# Patient Record
Sex: FEMALE | Race: WHITE
[De-identification: names, ages, dates, MRNs, and addresses within clinical notes are randomized per-mention and may not be internally consistent; named-entity substitution may affect disease eponyms.]

---

## 2020-05-23 ENCOUNTER — HOSPITAL ENCOUNTER (EMERGENCY)
Dept: HOSPITAL 11 - JP.ED | Age: 50
Discharge: HOME | End: 2020-05-23
Payer: COMMERCIAL

## 2020-05-23 DIAGNOSIS — Z88.1: ICD-10-CM
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
## 2020-05-23 DIAGNOSIS — J45.909: ICD-10-CM

## 2020-05-23 DIAGNOSIS — Z23: ICD-10-CM

## 2020-05-23 DIAGNOSIS — S60.552A: Primary | ICD-10-CM

## 2020-05-23 DIAGNOSIS — Z88.2: ICD-10-CM

## 2020-05-23 NOTE — EDM.PDOC
ED HPI GENERAL MEDICAL PROBLEM





- General


Chief Complaint: Skin Complaint


Stated Complaint: HOOK IN LEFT HAND


Time Seen by Provider: 20 21:22


Source of Information: Reports: Patient


History Limitations: Reports: No Limitations





- History of Present Illness


INITIAL COMMENTS - FREE TEXT/NARRATIVE: 





Patient presents for evaluation of a fishhook embedded in left hand. She had 

been fishing earlier today and near the conclusion of the day happened to 

inadvertently lodge one of the hooks in the dorsum of left hand. There were is 

debate over exactly what to do with this at home versus coming in for removal. 

Based on how it felt, she decided to come in here. There is been minimal 

bleeding from the puncture area. She does not recall previous recent tetanus 

vaccination.


Onset: Today, Sudden


Location: Reports: Upper Extremity, Left


Quality: Reports: Sharp


Improves with: Reports: None


Worsens with: Reports: Movement


Associated Symptoms: Reports: No Other Symptoms


  ** Left Hand


Pain Score (Numeric/FACES): 5





- Related Data


 Allergies











Allergy/AdvReac Type Severity Reaction Status Date / Time


 


sulfamethoxazole Allergy  Rash Verified 20 21:18





[From Bactrim]     


 


trimethoprim [From Bactrim] Allergy  Rash Verified 20 21:18











Home Meds: 


 Home Meds





NK [No Known Home Meds]  20 [History]











Past Medical History


HEENT History: Reports: None


Cardiovascular History: Reports: None


Respiratory History: Reports: Asthma


Gastrointestinal History: Reports: None


OB/GYN History: Reports: Pregnancy


Musculoskeletal History: Reports: None


Neurological History: Reports: None


Psychiatric History: Reports: None


Endocrine/Metabolic History: Reports: None


Hematologic History: Reports: None


Immunologic History: Reports: None


Oncologic (Cancer) History: Reports: None


Dermatologic History: Reports: None





- Infectious Disease History


Infectious Disease History: Reports: None





- Past Surgical History


Head Surgeries/Procedures: Reports: None


Female  Surgical History: Reports:  Section





ED ROS GENERAL





- Review of Systems


Review Of Systems: Comprehensive ROS is negative, except as noted in HPI.





ED EXAM, SKIN/RASH


Exam: See Below


Text/Narrative:: 





This is an adult female seated on the exam table in room 1.


Exam Limited By: No Limitations


General Appearance: Alert, No Apparent Distress


Extremities: Other (1 of 3 parts of a lure segment is embedded in the hand 

along the path of metacarpal 4. There is no active bleeding at this time.)





Course





- Vital Signs


Last Recorded V/S: 


 Last Vital Signs











Temp  35.6 C L  20 21:21


 


Pulse  58 L  20 21:21


 


Resp  16   20 21:21


 


BP  131/72   20 21:21


 


Pulse Ox  100   20 21:21














- Orders/Labs/Meds


Orders: 


 Active Orders 24 hr











 Category Date Time Status


 


 Vaccines to be Administered [RC] PER UNIT ROUTINE Care  20 22:26 Active











Meds: 


Medications














Discontinued Medications














Generic Name Dose Route Start Last Admin





  Trade Name Florina  PRN Reason Stop Dose Admin


 


Bacitracin  1 dose  20 22:20  20 22:31





  Bacitracin Oint 1 Gm  TOP  20 22:21  1 dose





  ONETIME ONE   Administration





     





     





     





     


 


Diphtheria/Tetanus/Acell Pertussis  0.5 ml  20 22:26  20 22:34





  Adacel  IM  20 22:27  0.5 ml





  .ONCE ONE   Administration





     





     





     





     


 


Diphtheria/Tetanus/Acell Pertussis  Confirm  20 22:28  





  Adacel  Administered  20 22:29  





  Dose   





  0.5 ml   





  .ROUTE   





  .STK-MED ONE   





     





     





     





     


 


Lidocaine/Epinephrine  2 ml  20 21:45  20 22:16





  Xylocaine 1% With Epinephrine 1:100,000  INFILT   2 ml





  ASDIRECTED [facility]   Administration





     





     





     





     














- Re-Assessments/Exams


Free Text/Narrative Re-Assessment/Exam: 





20 03:10


I reviewed options for hook removal. 1% lidocaine with epinephrine was used to 

infiltrate the puncture zone. Following this, the hook karen was rotated until 

it poked out through the skin. An orthopedic pin cutter was used to snip the 

karen from the lure and the remainder of the hook was backed out through its 

point of entry. There was no additional bleeding area did bacitracin and a Band-

Aid were applied to the puncture site. I recommend that she use soap and water 

washing to the area at least once a day followed by application of a small 

amount of bacitracin or similar first-aid ointment twice daily with Band-Aid 

covering. Reviewed signs of infection to watch for. She was given an Adacel 

booster.





Departure





- Departure


Time of Disposition: 22:25


Disposition: Home, Self-Care 01


Condition: Good


Clinical Impression: 


Fish hook injury of hand


Qualifiers:


 Encounter type: initial encounter Laterality: left Qualified Code(s): S69.92XA 

- Unspecified injury of left wrist, hand and finger(s), initial encounter








- Discharge Information


*PRESCRIPTION DRUG MONITORING PROGRAM REVIEWED*: Not Applicable


*COPY OF PRESCRIPTION DRUG MONITORING REPORT IN PATIENT VAISHALI: Not Applicable


Instructions:  VIS, DTaP (Diphtheria, Tetanus, Pertussis) Vaccine - [facility] (2018)


Referrals: 


PCP,None [Primary Care Provider] - 


Forms:  ED Department Discharge


Additional Instructions: 


Use soap and water cleansing to Fish hook puncture site every day. Apply a dab 

of bacitracin or similar antibiotic ointment followed by Band-Aid covering 

twice daily for the next week. If you noticed increased redness, pus discharge, 

pain, return to emergency department for reevaluation. You have been given a 

tetanus, diphtheria, pertussis vaccine booster tonight.





Sepsis Event Note





- Focused Exam


Vital Signs: 


 Vital Signs











  Temp Pulse Resp BP Pulse Ox


 


 20 21:21  35.6 C L  58 L  16  131/72  100


 


 20 21:16  35.6 C L  58 L  16  131/72  100











Date Exam was Performed: 20


Time Exam was Performed: 03:06





- My Orders


Last 24 Hours: 


My Active Orders





20 22:26


Vaccines to be Administered [RC] PER UNIT ROUTINE 














- Assessment/Plan


Last 24 Hours: 


My Active Orders





20 22:26


Vaccines to be Administered [RC] PER UNIT ROUTINE